# Patient Record
Sex: MALE | Race: BLACK OR AFRICAN AMERICAN | NOT HISPANIC OR LATINO | Employment: UNEMPLOYED | URBAN - METROPOLITAN AREA
[De-identification: names, ages, dates, MRNs, and addresses within clinical notes are randomized per-mention and may not be internally consistent; named-entity substitution may affect disease eponyms.]

---
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
## 2019-03-25 ENCOUNTER — HOSPITAL ENCOUNTER (EMERGENCY)
Facility: HOSPITAL | Age: 19
Discharge: HOME | End: 2019-03-25
Attending: EMERGENCY MEDICINE
Payer: MEDICAID

## 2019-03-25 VITALS
DIASTOLIC BLOOD PRESSURE: 72 MMHG | WEIGHT: 245 LBS | HEIGHT: 62 IN | BODY MASS INDEX: 45.08 KG/M2 | TEMPERATURE: 97 F | RESPIRATION RATE: 18 BRPM | SYSTOLIC BLOOD PRESSURE: 108 MMHG | HEART RATE: 80 BPM | OXYGEN SATURATION: 100 %

## 2019-03-25 DIAGNOSIS — R05.9 COUGH: ICD-10-CM

## 2019-03-25 DIAGNOSIS — J06.9 URI, ACUTE: Primary | ICD-10-CM

## 2019-03-25 LAB
DEPRECATED S PYO AG THROAT QL EIA: NEGATIVE
INFLUENZA A, MOLECULAR: NEGATIVE
INFLUENZA B, MOLECULAR: NEGATIVE
SPECIMEN SOURCE: NORMAL

## 2019-03-25 PROCEDURE — 87081 CULTURE SCREEN ONLY: CPT | Mod: ER

## 2019-03-25 PROCEDURE — 87880 STREP A ASSAY W/OPTIC: CPT | Mod: ER

## 2019-03-25 PROCEDURE — 87502 INFLUENZA DNA AMP PROBE: CPT | Mod: ER

## 2019-03-25 PROCEDURE — 99283 EMERGENCY DEPT VISIT LOW MDM: CPT | Mod: ER

## 2019-03-25 RX ORDER — BENZONATATE 100 MG/1
100 CAPSULE ORAL 3 TIMES DAILY PRN
Qty: 10 CAPSULE | Refills: 0 | Status: SHIPPED | OUTPATIENT
Start: 2019-03-25 | End: 2019-04-04

## 2019-03-26 NOTE — ED PROVIDER NOTES
Encounter Date: 3/25/2019       History     Chief Complaint   Patient presents with    Sore Throat     Patient stated that his throat started hurting x2 weeks ago. Swallowing difficulties when eating and drinking.     Cough     Patient stated cough started x2 weeks ago. Productive cough with yellow sputum. Denies fever. Patient stated that chest hurts when he coughs.      18-year-old male presents to the emergency department for evaluation of 2 week history of intermittent nasal congestion, mild sore throat, body aches and cough.  He reports that approximately 3 weeks ago was diagnosed with influenza.  He reports that he was prescribed Tamiflu.  He reports that he 1 of the leftover Tamiflu pills this morning to help with symptoms. He denies any fever, headache, dizziness, neck pain, chest pain, palpitations, abdominal pain, nausea, vomiting, dysuria or hematuria.  Reports that the cough is a mild, intermittently productive cough with yellow sputum.        Review of patient's allergies indicates:  No Known Allergies  Past Medical History:   Diagnosis Date    ADHD (attention deficit hyperactivity disorder)      History reviewed. No pertinent surgical history.  History reviewed. No pertinent family history.  Social History     Tobacco Use    Smoking status: Passive Smoke Exposure - Never Smoker    Smokeless tobacco: Never Used   Substance Use Topics    Alcohol use: Never     Frequency: Never    Drug use: Not on file     Review of Systems   Constitutional: Negative for activity change, appetite change and fever.   HENT: Positive for congestion, rhinorrhea and sore throat. Negative for postnasal drip, sinus pressure, sinus pain, trouble swallowing and voice change.    Eyes: Negative for photophobia, discharge, redness and visual disturbance.   Respiratory: Positive for cough. Negative for chest tightness, shortness of breath and wheezing.    Cardiovascular: Negative for chest pain.   Gastrointestinal: Negative for  abdominal pain, constipation, diarrhea, nausea and vomiting.   Genitourinary: Negative for decreased urine volume, dysuria and genital sores.   Musculoskeletal: Negative for back pain, joint swelling, neck pain and neck stiffness.   Skin: Negative for rash.   Neurological: Negative for dizziness, syncope, weakness, light-headedness, numbness and headaches.   Hematological: Does not bruise/bleed easily.       Physical Exam     Initial Vitals [03/25/19 2210]   BP Pulse Resp Temp SpO2   133/81 85 20 98.5 °F (36.9 °C) 98 %      MAP       --         Physical Exam    Nursing note and vitals reviewed.  Constitutional: He appears well-developed and well-nourished. He is not diaphoretic. No distress.   HENT:   Head: Normocephalic and atraumatic.   Right Ear: External ear normal.   Left Ear: External ear normal.   Mouth/Throat: Oropharynx is clear and moist. No oropharyngeal exudate.   TMs reveal no erythema.  No tenderness to percussion noted over the frontal or maxillary sinuses bilaterally.  No postnasal drip noted. Posterior pharynx reveals erythema, edema or tonsillar exudate. No uvular edema or deviation noted.  No trismus, stridor or drooling noted.   Eyes: Conjunctivae and EOM are normal. Pupils are equal, round, and reactive to light.   Neck: Normal range of motion. Neck supple.   Cardiovascular: Normal rate, regular rhythm and normal heart sounds.   Pulmonary/Chest: Breath sounds normal. No respiratory distress. He has no wheezes. He has no rhonchi. He has no rales. He exhibits no tenderness.   Abdominal: Soft. Bowel sounds are normal. He exhibits no distension. There is no tenderness. There is no guarding.   Musculoskeletal: Normal range of motion.   Lymphadenopathy:     He has no cervical adenopathy.   Neurological: He is alert and oriented to person, place, and time.   Skin: Skin is warm and dry.   Psychiatric: He has a normal mood and affect.         ED Course   Procedures  Labs Reviewed   THROAT SCREEN, RAPID    INFLUENZA A & B BY MOLECULAR   CULTURE, STREP A,  THROAT          Imaging Results          X-Ray Chest PA And Lateral (Final result)  Result time 03/25/19 22:51:49    Final result by Jeffrey Edmondson III, MD (03/25/19 22:51:49)                 Impression:      No acute disease identified in the chest.      Electronically signed by: Jeffrey Edmondson MD  Date:    03/25/2019  Time:    22:51             Narrative:    EXAMINATION:  XR CHEST PA AND LATERAL    CLINICAL HISTORY:  Cough    COMPARISON:  none    FINDINGS:  The cardiomediastinal silhouette is within normal limits.  The lungs appear clear of active disease.  No acute appearing infiltrate, pleural effusion or pneumothorax identified.  No acute osseous abnormality identified.                                 Medical Decision Making:   Initial Assessment:   18-year-old male presents for evaluation of pharyngitis and cough.  Physical exam reveals a nontoxic-appearing male in no acute distress. Patient is afebrile vital signs within normal limits.  Neurological exam reveals an alert and oriented patient. TMs reveal no erythema.  No tenderness to percussion noted over the frontal or maxillary sinuses bilaterally.  No postnasal drip noted. Posterior pharynx reveals erythema, edema or tonsillar exudate. No uvular edema or deviation noted.  No trismus, stridor or drooling noted. Neck is supple, no meningeal signs noted. Lungs clear to auscultation bilaterally.  No respiratory distress or accessory muscle use noted.  Abdominal exam reveals soft abdomen, nontender to palpation.  Differential Diagnosis:   Chest x-ray ordered to assess possible pneumonia or consolidation.  Viral URI  Influenza  Streptococcal pharyngitis  Carefully considered but doubt serious etiology including peritonsillar abscess.  ED Management:  Rapid strep negative.  Influenza negative.  Chest x-ray reveals no acute findings.  Probable URI of likely viral etiology.  Symptomatic treatment advised.   Instructed the patient to follow up with his primary care provider for re-evaluation within 3 days.  Instructed the patient to return to the emergency department immediately for any new or worsening symptoms.                      Clinical Impression:       ICD-10-CM ICD-9-CM   1. URI, acute J06.9 465.9   2. Cough R05 786.2                                Helene Rene PA-C  03/26/19 0222

## 2019-03-26 NOTE — DISCHARGE INSTRUCTIONS
Your chest x-ray did not reveal any evidence of pneumonia or consolidation.  Your influenza and strep test were both negative. Probable URI of likely viral etiology.    You are advised to follow up with your primary care provider for re-evaluation within 3 days.  You are instructed to return to the emergency department immediately for any new or worsening symptoms.

## 2019-03-28 LAB — BACTERIA THROAT CULT: NORMAL

## 2019-07-10 ENCOUNTER — HOSPITAL ENCOUNTER (EMERGENCY)
Facility: HOSPITAL | Age: 19
Discharge: HOME | End: 2019-07-10
Attending: SURGERY
Payer: MEDICAID

## 2019-07-10 VITALS
HEIGHT: 61 IN | RESPIRATION RATE: 19 BRPM | DIASTOLIC BLOOD PRESSURE: 81 MMHG | WEIGHT: 240 LBS | SYSTOLIC BLOOD PRESSURE: 137 MMHG | TEMPERATURE: 98 F | OXYGEN SATURATION: 97 % | HEART RATE: 81 BPM | BODY MASS INDEX: 45.31 KG/M2

## 2019-07-10 DIAGNOSIS — N48.1 BALANITIS: Primary | ICD-10-CM

## 2019-07-10 LAB
BILIRUB UR QL STRIP: NEGATIVE
CLARITY UR REFRACT.AUTO: CLEAR
COLOR UR AUTO: YELLOW
GLUCOSE UR QL STRIP: NEGATIVE
HGB UR QL STRIP: NEGATIVE
KETONES UR QL STRIP: NEGATIVE
LEUKOCYTE ESTERASE UR QL STRIP: NEGATIVE
NITRITE UR QL STRIP: NEGATIVE
PH UR STRIP: 6 [PH] (ref 5–8)
PROT UR QL STRIP: NEGATIVE
SP GR UR STRIP: 1.02 (ref 1–1.03)
URN SPEC COLLECT METH UR: NORMAL
UROBILINOGEN UR STRIP-ACNC: 1 EU/DL

## 2019-07-10 PROCEDURE — 25000003 PHARM REV CODE 250: Mod: ER | Performed by: PHYSICIAN ASSISTANT

## 2019-07-10 PROCEDURE — 63600175 PHARM REV CODE 636 W HCPCS: Mod: ER | Performed by: PHYSICIAN ASSISTANT

## 2019-07-10 PROCEDURE — 87491 CHLMYD TRACH DNA AMP PROBE: CPT | Mod: ER

## 2019-07-10 PROCEDURE — 96372 THER/PROPH/DIAG INJ SC/IM: CPT | Mod: ER

## 2019-07-10 PROCEDURE — 99284 EMERGENCY DEPT VISIT MOD MDM: CPT | Mod: 25,ER

## 2019-07-10 PROCEDURE — 81003 URINALYSIS AUTO W/O SCOPE: CPT | Mod: ER

## 2019-07-10 RX ORDER — AZITHROMYCIN 250 MG/1
1000 TABLET, FILM COATED ORAL
Status: COMPLETED | OUTPATIENT
Start: 2019-07-10 | End: 2019-07-10

## 2019-07-10 RX ORDER — CEFTRIAXONE 250 MG/1
250 INJECTION, POWDER, FOR SOLUTION INTRAMUSCULAR; INTRAVENOUS
Status: COMPLETED | OUTPATIENT
Start: 2019-07-10 | End: 2019-07-10

## 2019-07-10 RX ADMIN — CEFTRIAXONE SODIUM 250 MG: 250 INJECTION, POWDER, FOR SOLUTION INTRAMUSCULAR; INTRAVENOUS at 05:07

## 2019-07-10 RX ADMIN — AZITHROMYCIN 1000 MG: 250 TABLET, FILM COATED ORAL at 05:07

## 2019-07-10 NOTE — DISCHARGE INSTRUCTIONS
You are advised to follow up with your primary care provider for re-evaluation within 3 days.  Your advised to see abstain from sex until your cultures result and all sexual partners are treated.  You are  instructed to return to the emergency department immediately for any new or worsening symptoms.

## 2019-07-10 NOTE — ED PROVIDER NOTES
"Encounter Date: 7/10/2019       History     Chief Complaint   Patient presents with    Penile discomfort     pt reports "redness" to shaft of penis x 2 days; denies discharge. +Unprotected sex. +Rash     19-year-old male presents to the emergency department for evaluation of penile rash.  He reports that yesterday he noticed a few small bumps and generalized redness over the head of his penis which has been constant since onset.  He reports that he did have an unprotected sexual encounter approximately 4 days ago.  He denies any ulcerations, pain or itching to the penis.  He denies any discharge from the penis.  He denies any dysuria, hematuria or frequency of urination.  He denies any other associated symptoms including headache, dizziness, chest pain, abdominal pain, nausea, vomiting or flank pain.  No treatment was attempted prior to arrival.        Review of patient's allergies indicates:  No Known Allergies  Past Medical History:   Diagnosis Date    ADHD (attention deficit hyperactivity disorder)      History reviewed. No pertinent surgical history.  History reviewed. No pertinent family history.  Social History     Tobacco Use    Smoking status: Current Every Day Smoker     Types: Cigarettes    Smokeless tobacco: Never Used   Substance Use Topics    Alcohol use: Never     Frequency: Never    Drug use: Yes     Types: Marijuana     Review of Systems   Constitutional: Negative for activity change, appetite change, fatigue and fever.   HENT: Negative for congestion, ear pain, rhinorrhea, sinus pain, sore throat, trouble swallowing and voice change.    Eyes: Negative for photophobia and visual disturbance.   Respiratory: Negative for shortness of breath.    Cardiovascular: Negative for chest pain.   Gastrointestinal: Negative for abdominal pain, constipation, diarrhea, nausea and vomiting.   Genitourinary: Negative for decreased urine volume, difficulty urinating, discharge, dysuria, flank pain, frequency, " genital sores, hematuria, penile swelling, scrotal swelling and testicular pain.   Musculoskeletal: Negative for back pain, joint swelling and neck stiffness.   Skin: Positive for rash.   Neurological: Negative for dizziness, syncope, weakness, light-headedness, numbness and headaches.   Hematological: Does not bruise/bleed easily.       Physical Exam     Initial Vitals [07/10/19 1549]   BP Pulse Resp Temp SpO2   137/81 81 19 97.6 °F (36.4 °C) 97 %      MAP       --         Physical Exam    Nursing note and vitals reviewed.  Constitutional: He appears well-developed and well-nourished. He is not diaphoretic. No distress.   HENT:   Head: Normocephalic and atraumatic.   Right Ear: External ear normal.   Left Ear: External ear normal.   Mouth/Throat: Oropharynx is clear and moist. No oropharyngeal exudate.   Eyes: Conjunctivae and EOM are normal. Pupils are equal, round, and reactive to light.   Neck: Normal range of motion. Neck supple.   Cardiovascular: Normal rate, regular rhythm and normal heart sounds.   Pulmonary/Chest: Breath sounds normal. No respiratory distress. He has no wheezes. He has no rhonchi. He has no rales. He exhibits no tenderness.   Abdominal: Soft. There is no tenderness. There is no CVA tenderness.   Genitourinary: Testes normal. Cremasteric reflex is present. Right testis shows no mass, no swelling and no tenderness. Left testis shows no mass, no swelling and no tenderness. Uncircumcised. Penile erythema (Mild erythema noted over the glans) present. No phimosis, paraphimosis or penile tenderness. No discharge found.   Lymphadenopathy:     He has no cervical adenopathy.   Neurological: He is alert and oriented to person, place, and time.   Skin: Skin is warm and dry.   Psychiatric: He has a normal mood and affect.         ED Course   Procedures  Labs Reviewed   C. TRACHOMATIS/N. GONORRHOEAE BY AMP DNA   URINALYSIS, REFLEX TO URINE CULTURE    Narrative:     Preferred Collection Type->Urine, Clean  Catch          Imaging Results    None          Medical Decision Making:   Initial Assessment:   19-year-old male presents to the emergency department for evaluation penile rash. Physical exam reveals a nontoxic-appearing male in no acute distress. Patient is afebrile vital signs within normal limits.  Neurological exam reveals an alert and oriented patient.  Lungs clear to auscultation bilaterally.  No respiratory distress or accessory muscle use noted.  Abdominal exam reveals soft abdomen, nontender to palpation. No CVA tenderness noted.  exam reveals mild erythema noted over the glans.  Small amount of smegma noted under the foreskin.  Foreskin able to be retracted and replaced without pain or discomfort.  No ulcerations, chancre or vesicles noted. No discharge noted. No tenderness to palpation noted over the testicles bilaterally.  Differential Diagnosis:   Balanitis  UTI  Gonorrhea  Chlamydia  Carefully considered but doubt serious etiology including herpes  ED Management:  Urinalysis reveals no evidence of UTI.  GC and chlamydia pending.  Will cover the patient with Rocephin and azithromycin in the emergency department.  Will prescribe mupirocin and nystatin cream for balanitis.  He was advised to follow up with his primary care provider for re-evaluation and to return to the emergency department immediately for any new or worsening symptoms right                      Clinical Impression:       ICD-10-CM ICD-9-CM   1. Balanitis N48.1 607.1                                Helene Rene PA-C  07/10/19 1800

## 2019-07-12 LAB
C TRACH DNA SPEC QL NAA+PROBE: NOT DETECTED
N GONORRHOEA DNA SPEC QL NAA+PROBE: NOT DETECTED

## 2021-09-22 ENCOUNTER — HOSPITAL ENCOUNTER (EMERGENCY)
Facility: HOSPITAL | Age: 21
Discharge: HOME | End: 2021-09-22
Attending: EMERGENCY MEDICINE
Payer: MEDICAID

## 2021-09-22 VITALS
HEART RATE: 96 BPM | RESPIRATION RATE: 16 BRPM | DIASTOLIC BLOOD PRESSURE: 71 MMHG | SYSTOLIC BLOOD PRESSURE: 111 MMHG | BODY MASS INDEX: 35.88 KG/M2 | WEIGHT: 195 LBS | OXYGEN SATURATION: 99 % | TEMPERATURE: 101 F | HEIGHT: 62 IN

## 2021-09-22 DIAGNOSIS — J02.8 ACUTE BACTERIAL PHARYNGITIS: Primary | ICD-10-CM

## 2021-09-22 DIAGNOSIS — B96.89 ACUTE BACTERIAL PHARYNGITIS: Primary | ICD-10-CM

## 2021-09-22 LAB
GROUP A STREP, MOLECULAR: NEGATIVE
SARS-COV-2 RDRP RESP QL NAA+PROBE: NEGATIVE

## 2021-09-22 PROCEDURE — 87651 STREP A DNA AMP PROBE: CPT | Mod: ER | Performed by: EMERGENCY MEDICINE

## 2021-09-22 PROCEDURE — 25000003 PHARM REV CODE 250: Mod: ER | Performed by: PHYSICIAN ASSISTANT

## 2021-09-22 PROCEDURE — 99283 EMERGENCY DEPT VISIT LOW MDM: CPT | Mod: ER

## 2021-09-22 PROCEDURE — U0002 COVID-19 LAB TEST NON-CDC: HCPCS | Mod: ER | Performed by: EMERGENCY MEDICINE

## 2021-09-22 RX ORDER — AMOXICILLIN 250 MG/1
500 CAPSULE ORAL
Status: DISCONTINUED | OUTPATIENT
Start: 2021-09-22 | End: 2021-09-22

## 2021-09-22 RX ORDER — AMOXICILLIN 500 MG/1
500 CAPSULE ORAL 3 TIMES DAILY
Qty: 21 CAPSULE | Refills: 0 | Status: SHIPPED | OUTPATIENT
Start: 2021-09-22 | End: 2021-09-29

## 2021-09-22 RX ORDER — ACETAMINOPHEN 500 MG
1000 TABLET ORAL
Status: COMPLETED | OUTPATIENT
Start: 2021-09-22 | End: 2021-09-22

## 2021-09-22 RX ORDER — AMOXICILLIN 250 MG/1
500 CAPSULE ORAL
Status: COMPLETED | OUTPATIENT
Start: 2021-09-22 | End: 2021-09-22

## 2021-09-22 RX ADMIN — AMOXICILLIN 500 MG: 250 CAPSULE ORAL at 09:09

## 2021-09-22 RX ADMIN — ACETAMINOPHEN 1000 MG: 500 TABLET ORAL at 08:09

## 2021-10-08 ENCOUNTER — HOSPITAL ENCOUNTER (EMERGENCY)
Facility: HOSPITAL | Age: 21
End: 2021-10-08
Attending: EMERGENCY MEDICINE
Payer: MEDICAID

## 2021-10-08 VITALS
HEART RATE: 114 BPM | OXYGEN SATURATION: 100 % | TEMPERATURE: 99 F | SYSTOLIC BLOOD PRESSURE: 145 MMHG | DIASTOLIC BLOOD PRESSURE: 96 MMHG | BODY MASS INDEX: 34.6 KG/M2 | WEIGHT: 188 LBS | RESPIRATION RATE: 26 BRPM | HEIGHT: 62 IN

## 2021-10-08 DIAGNOSIS — T14.8XXA STAB WOUND: ICD-10-CM

## 2021-10-08 DIAGNOSIS — S27.0XXA TRAUMATIC PNEUMOTHORAX, INITIAL ENCOUNTER: Primary | ICD-10-CM

## 2021-10-08 LAB
ALBUMIN SERPL BCP-MCNC: 4.6 G/DL (ref 3.5–5.2)
ALP SERPL-CCNC: 82 U/L (ref 38–126)
ALT SERPL W/O P-5'-P-CCNC: 69 U/L (ref 10–44)
ANION GAP SERPL CALC-SCNC: 12 MMOL/L (ref 8–16)
AST SERPL-CCNC: 47 U/L (ref 15–46)
BASOPHILS # BLD AUTO: 0.04 K/UL (ref 0–0.2)
BASOPHILS NFR BLD: 0.4 % (ref 0–1.9)
BILIRUB SERPL-MCNC: 0.8 MG/DL (ref 0.1–1)
CALCIUM SERPL-MCNC: 9.5 MG/DL (ref 8.7–10.5)
CHLORIDE SERPL-SCNC: 103 MMOL/L (ref 95–110)
CO2 SERPL-SCNC: 31 MMOL/L (ref 23–29)
CREAT SERPL-MCNC: 1.09 MG/DL (ref 0.5–1.4)
DIFFERENTIAL METHOD: NORMAL
EOSINOPHIL # BLD AUTO: 0 K/UL (ref 0–0.5)
EOSINOPHIL NFR BLD: 0.3 % (ref 0–8)
ERYTHROCYTE [DISTWIDTH] IN BLOOD BY AUTOMATED COUNT: 11.9 % (ref 11.5–14.5)
EST. GFR  (AFRICAN AMERICAN): >60 ML/MIN/1.73 M^2
EST. GFR  (NON AFRICAN AMERICAN): >60 ML/MIN/1.73 M^2
GLUCOSE SERPL-MCNC: 124 MG/DL (ref 70–110)
HCT VFR BLD AUTO: 44.3 % (ref 40–54)
HGB BLD-MCNC: 14.8 G/DL (ref 14–18)
IMM GRANULOCYTES # BLD AUTO: 0.02 K/UL (ref 0–0.04)
IMM GRANULOCYTES NFR BLD AUTO: 0.2 % (ref 0–0.5)
LYMPHOCYTES # BLD AUTO: 3.5 K/UL (ref 1–4.8)
LYMPHOCYTES NFR BLD: 33.2 % (ref 18–48)
MCH RBC QN AUTO: 30.5 PG (ref 27–31)
MCHC RBC AUTO-ENTMCNC: 33.4 G/DL (ref 32–36)
MCV RBC AUTO: 91 FL (ref 82–98)
MONOCYTES # BLD AUTO: 0.7 K/UL (ref 0.3–1)
MONOCYTES NFR BLD: 7 % (ref 4–15)
NEUTROPHILS # BLD AUTO: 6.1 K/UL (ref 1.8–7.7)
NEUTROPHILS NFR BLD: 58.9 % (ref 38–73)
NRBC BLD-RTO: 0 /100 WBC
PLATELET # BLD AUTO: 296 K/UL (ref 150–450)
PMV BLD AUTO: 11.1 FL (ref 9.2–12.9)
POTASSIUM SERPL-SCNC: 3.8 MMOL/L (ref 3.5–5.1)
PROT SERPL-MCNC: 8.5 G/DL (ref 6–8.4)
RBC # BLD AUTO: 4.85 M/UL (ref 4.6–6.2)
SARS-COV-2 RDRP RESP QL NAA+PROBE: POSITIVE
SODIUM SERPL-SCNC: 146 MMOL/L (ref 136–145)
UUN UR-MCNC: 15 MG/DL (ref 2–20)
WBC # BLD AUTO: 10.4 K/UL (ref 3.9–12.7)

## 2021-10-08 PROCEDURE — 80053 COMPREHEN METABOLIC PANEL: CPT | Mod: ER | Performed by: EMERGENCY MEDICINE

## 2021-10-08 PROCEDURE — 85025 COMPLETE CBC W/AUTO DIFF WBC: CPT | Mod: ER | Performed by: EMERGENCY MEDICINE

## 2021-10-08 PROCEDURE — 93010 ELECTROCARDIOGRAM REPORT: CPT | Mod: ,,, | Performed by: INTERNAL MEDICINE

## 2021-10-08 PROCEDURE — 99291 CRITICAL CARE FIRST HOUR: CPT | Mod: 25,ER

## 2021-10-08 PROCEDURE — 93005 ELECTROCARDIOGRAM TRACING: CPT | Mod: ER

## 2021-10-08 PROCEDURE — 93010 EKG 12-LEAD: ICD-10-PCS | Mod: ,,, | Performed by: INTERNAL MEDICINE

## 2021-10-08 PROCEDURE — U0002 COVID-19 LAB TEST NON-CDC: HCPCS | Mod: ER | Performed by: EMERGENCY MEDICINE

## 2021-10-09 DIAGNOSIS — U07.1 COVID-19 VIRUS DETECTED: ICD-10-CM
